# Patient Record
Sex: FEMALE | Race: WHITE | NOT HISPANIC OR LATINO
[De-identification: names, ages, dates, MRNs, and addresses within clinical notes are randomized per-mention and may not be internally consistent; named-entity substitution may affect disease eponyms.]

---

## 2021-03-18 ENCOUNTER — APPOINTMENT (OUTPATIENT)
Age: 46
End: 2021-03-18
Payer: COMMERCIAL

## 2021-03-18 PROCEDURE — 0001A: CPT

## 2021-04-23 ENCOUNTER — APPOINTMENT (OUTPATIENT)
Age: 46
End: 2021-04-23
Payer: COMMERCIAL

## 2021-04-23 PROCEDURE — 0002A: CPT

## 2021-05-07 PROBLEM — Z00.00 ENCOUNTER FOR PREVENTIVE HEALTH EXAMINATION: Status: ACTIVE | Noted: 2021-05-07

## 2021-05-10 ENCOUNTER — APPOINTMENT (OUTPATIENT)
Dept: PHYSICAL MEDICINE AND REHAB | Facility: CLINIC | Age: 46
End: 2021-05-10
Payer: COMMERCIAL

## 2021-05-10 VITALS — BODY MASS INDEX: 29.55 KG/M2 | WEIGHT: 195 LBS | HEIGHT: 68 IN | RESPIRATION RATE: 16 BRPM

## 2021-05-10 DIAGNOSIS — Z78.9 OTHER SPECIFIED HEALTH STATUS: ICD-10-CM

## 2021-05-10 PROCEDURE — 99072 ADDL SUPL MATRL&STAF TM PHE: CPT

## 2021-05-10 PROCEDURE — 99204 OFFICE O/P NEW MOD 45 MIN: CPT

## 2021-05-10 RX ORDER — CYCLOBENZAPRINE HYDROCHLORIDE 10 MG/1
10 TABLET, FILM COATED ORAL
Qty: 30 | Refills: 0 | Status: ACTIVE | COMMUNITY
Start: 2020-12-04

## 2021-05-10 NOTE — PHYSICAL EXAM
[FreeTextEntry1] : LUMBAR\par GEN: AAOx3. NAD.\par LS ROM: Flexion, extension, side-bending, rotation, oblique extension all full/pain free.  \par HIP ROM: Full and pain B/L.\par PALP: No TTP midline spinous processes, paravertebral muscles, SIJ, or greater trochanters B/L.\par INSP: Spine alignment is midline, with no evidence of scoliosis.\par STRENGTH: 4+/5 L-Ev/PF. Otherwise 5/5 BLE\par SENS: Grossly intact to LT BLE.\par REFLEXES: Symmetric patella, achilles. \par TONE: Normal, No clonus.\par STANCE: No Trendelenburg with single leg stance.\par GAIT: Non antalgic, normal reciprocating heel to toe\par SPECIAL: SLR and Slump (+) LEFT. FADIR/LEANNE (-) B/L.

## 2021-05-10 NOTE — ASSESSMENT
[FreeTextEntry1] : Impression:\par 1. Left S1 Radiculopathy\par \par Plan: The history and physical examination were reviewed. Symptoms are consistent with Chronic Left S1 radiculopathy.  The diagnosis was discussed with the patient along with treatment options including physical therapy, oral medication, interventional spine procedures, and surgery; as well as alternative therapeutics such as acupuncture and massage. We also discussed the importance of weight loss, ergonomics, and posture as they pertain to the current condition as well as overall health and well being. I am recommending that the patient undergo an MRI of the LSpine to further evaluate for disc pathology and nerve root involvement in preparation for possible RONY. The patient was educated on red flag symptoms and was instructed to call the office should the current condition worsen or new symptoms develop. The patient will follow up for imaging review. The patient expressed verbal understanding and is in agreement with the plan of care. All of the patient's questions and concerns were addressed during today's visit.\par \par A total of 30 minutes was spent for the duration of this encounter.\par

## 2021-05-10 NOTE — DATA REVIEWED
[FreeTextEntry1] : MRI LS 12/2018: L4/5 right paracentral/foraminal herniation with posterior annular fissure. L5/S1 left paracentral herniation with posterior annular fissure and impingement of the left S1 nerve root.

## 2021-05-10 NOTE — HISTORY OF PRESENT ILLNESS
[FreeTextEntry1] : Ms. IESHA GOTTLIEB is a very pleasant 45 year female who comes in for evaluation of Lower back pain that has been ongoing for 25 years without any specific injury or inciting event. The patient has done PT, Chiropractics, and oral medication with moderate but temporary relief. The pain is located primarily on her lower back radiating to left leg intermittent and described as achy and sharp. The pain is rated as 1/10 and ranges from 1-10/10. The patient's symptoms are aggravated by sitting, bending, stairs, transitioning into a different position and alleviated by rest, ice. The patient works for [employer] which consists of sitting. The patient feels numbness/tingling but denies any night pain, weakness, or bowel/bladder dysfunction. The patient has no other complaints at this time.\par

## 2021-05-11 ENCOUNTER — TRANSCRIPTION ENCOUNTER (OUTPATIENT)
Age: 46
End: 2021-05-11

## 2021-05-20 ENCOUNTER — APPOINTMENT (OUTPATIENT)
Dept: PHYSICAL MEDICINE AND REHAB | Facility: CLINIC | Age: 46
End: 2021-05-20

## 2021-10-14 ENCOUNTER — APPOINTMENT (OUTPATIENT)
Dept: INTERNAL MEDICINE | Facility: CLINIC | Age: 46
End: 2021-10-14
Payer: COMMERCIAL

## 2021-10-14 VITALS — WEIGHT: 200 LBS | BODY MASS INDEX: 30.31 KG/M2 | HEIGHT: 68 IN

## 2021-10-14 PROCEDURE — 97802 MEDICAL NUTRITION INDIV IN: CPT | Mod: 95

## 2021-11-04 ENCOUNTER — APPOINTMENT (OUTPATIENT)
Dept: INTERNAL MEDICINE | Facility: CLINIC | Age: 46
End: 2021-11-04
Payer: COMMERCIAL

## 2021-11-04 VITALS — WEIGHT: 198.06 LBS | HEIGHT: 68 IN | BODY MASS INDEX: 30.02 KG/M2

## 2021-11-04 PROCEDURE — 97803 MED NUTRITION INDIV SUBSEQ: CPT | Mod: 95

## 2021-12-16 ENCOUNTER — APPOINTMENT (OUTPATIENT)
Dept: INTERNAL MEDICINE | Facility: CLINIC | Age: 46
End: 2021-12-16
Payer: COMMERCIAL

## 2021-12-16 VITALS — HEIGHT: 68 IN | WEIGHT: 198 LBS | BODY MASS INDEX: 30.01 KG/M2

## 2021-12-16 PROCEDURE — 97803 MED NUTRITION INDIV SUBSEQ: CPT | Mod: 95

## 2022-01-27 ENCOUNTER — APPOINTMENT (OUTPATIENT)
Dept: INTERNAL MEDICINE | Facility: CLINIC | Age: 47
End: 2022-01-27
Payer: COMMERCIAL

## 2022-01-27 VITALS — HEIGHT: 68 IN | WEIGHT: 200 LBS | BODY MASS INDEX: 30.31 KG/M2

## 2022-01-27 PROCEDURE — 97803 MED NUTRITION INDIV SUBSEQ: CPT | Mod: 95

## 2022-02-17 ENCOUNTER — APPOINTMENT (OUTPATIENT)
Dept: INTERNAL MEDICINE | Facility: CLINIC | Age: 47
End: 2022-02-17
Payer: COMMERCIAL

## 2022-02-17 PROCEDURE — 97803 MED NUTRITION INDIV SUBSEQ: CPT | Mod: 95

## 2022-03-03 ENCOUNTER — APPOINTMENT (OUTPATIENT)
Dept: INTERNAL MEDICINE | Facility: CLINIC | Age: 47
End: 2022-03-03
Payer: COMMERCIAL

## 2022-03-03 PROCEDURE — 97803 MED NUTRITION INDIV SUBSEQ: CPT | Mod: 95

## 2022-03-31 ENCOUNTER — APPOINTMENT (OUTPATIENT)
Dept: INTERNAL MEDICINE | Facility: CLINIC | Age: 47
End: 2022-03-31
Payer: COMMERCIAL

## 2022-03-31 VITALS — BODY MASS INDEX: 31.07 KG/M2 | WEIGHT: 205 LBS | HEIGHT: 68 IN

## 2022-03-31 DIAGNOSIS — R63.5 ABNORMAL WEIGHT GAIN: ICD-10-CM

## 2022-03-31 PROCEDURE — 97803 MED NUTRITION INDIV SUBSEQ: CPT | Mod: 95

## 2022-04-01 PROBLEM — R63.5 WEIGHT GAIN: Status: ACTIVE | Noted: 2021-10-14

## 2022-04-13 ENCOUNTER — APPOINTMENT (OUTPATIENT)
Dept: ORTHOPEDIC SURGERY | Facility: CLINIC | Age: 47
End: 2022-04-13
Payer: COMMERCIAL

## 2022-04-13 ENCOUNTER — RESULT REVIEW (OUTPATIENT)
Age: 47
End: 2022-04-13

## 2022-04-13 ENCOUNTER — OUTPATIENT (OUTPATIENT)
Dept: OUTPATIENT SERVICES | Facility: HOSPITAL | Age: 47
LOS: 1 days | End: 2022-04-13
Payer: COMMERCIAL

## 2022-04-13 VITALS — RESPIRATION RATE: 16 BRPM | BODY MASS INDEX: 30.31 KG/M2 | HEIGHT: 68 IN | WEIGHT: 200 LBS

## 2022-04-13 DIAGNOSIS — Z82.61 FAMILY HISTORY OF ARTHRITIS: ICD-10-CM

## 2022-04-13 DIAGNOSIS — Z87.39 PERSONAL HISTORY OF OTHER DISEASES OF THE MUSCULOSKELETAL SYSTEM AND CONNECTIVE TISSUE: ICD-10-CM

## 2022-04-13 PROCEDURE — 73610 X-RAY EXAM OF ANKLE: CPT

## 2022-04-13 PROCEDURE — 99203 OFFICE O/P NEW LOW 30 MIN: CPT

## 2022-04-13 PROCEDURE — 73630 X-RAY EXAM OF FOOT: CPT | Mod: 26,RT

## 2022-04-13 PROCEDURE — 73630 X-RAY EXAM OF FOOT: CPT

## 2022-04-13 PROCEDURE — 73610 X-RAY EXAM OF ANKLE: CPT | Mod: 26,RT

## 2022-04-13 RX ORDER — AMOXICILLIN AND CLAVULANATE POTASSIUM 875; 125 MG/1; MG/1
875-125 TABLET, COATED ORAL
Qty: 20 | Refills: 0 | Status: DISCONTINUED | COMMUNITY
Start: 2021-03-23 | End: 2022-04-13

## 2022-04-13 RX ORDER — PROMETHAZINE HYDROCHLORIDE AND DEXTROMETHORPHAN HYDROBROMIDE ORAL SOLUTION 15; 6.25 MG/5ML; MG/5ML
6.25-15 SOLUTION ORAL
Qty: 140 | Refills: 0 | Status: DISCONTINUED | COMMUNITY
Start: 2021-03-23 | End: 2022-04-13

## 2022-04-13 RX ORDER — VALACYCLOVIR 1 G/1
1 TABLET, FILM COATED ORAL
Qty: 20 | Refills: 0 | Status: DISCONTINUED | COMMUNITY
Start: 2020-11-28 | End: 2022-04-13

## 2022-04-13 RX ORDER — MOMETASONE FUROATE 1 MG/G
0.1 OINTMENT TOPICAL
Qty: 45 | Refills: 0 | Status: DISCONTINUED | COMMUNITY
Start: 2020-11-28 | End: 2022-04-13

## 2022-04-13 RX ORDER — DICLOFENAC SODIUM 50 MG/1
50 TABLET, DELAYED RELEASE ORAL
Qty: 90 | Refills: 0 | Status: DISCONTINUED | COMMUNITY
Start: 2020-12-04 | End: 2022-04-13

## 2022-04-13 RX ORDER — LIDOCAINE 4% 4 G/100G
4 CREAM TOPICAL
Qty: 30 | Refills: 0 | Status: DISCONTINUED | COMMUNITY
Start: 2020-11-28 | End: 2022-04-13

## 2022-04-13 RX ORDER — MUPIROCIN 20 MG/G
2 OINTMENT TOPICAL
Qty: 22 | Refills: 0 | Status: DISCONTINUED | COMMUNITY
Start: 2020-11-10 | End: 2022-04-13

## 2022-04-13 RX ORDER — CHLORHEXIDINE GLUCONATE, 0.12% ORAL RINSE 1.2 MG/ML
0.12 SOLUTION DENTAL
Qty: 473 | Refills: 0 | Status: DISCONTINUED | COMMUNITY
Start: 2021-03-15 | End: 2022-04-13

## 2022-05-05 ENCOUNTER — APPOINTMENT (OUTPATIENT)
Dept: INTERNAL MEDICINE | Facility: CLINIC | Age: 47
End: 2022-05-05

## 2022-05-17 ENCOUNTER — NON-APPOINTMENT (OUTPATIENT)
Age: 47
End: 2022-05-17

## 2022-06-08 ENCOUNTER — APPOINTMENT (OUTPATIENT)
Dept: ORTHOPEDIC SURGERY | Facility: CLINIC | Age: 47
End: 2022-06-08
Payer: COMMERCIAL

## 2022-06-08 VITALS — BODY MASS INDEX: 30.31 KG/M2 | RESPIRATION RATE: 16 BRPM | WEIGHT: 200 LBS | HEIGHT: 68 IN

## 2022-06-08 DIAGNOSIS — M24.271 DISORDER OF LIGAMENT, RIGHT ANKLE: ICD-10-CM

## 2022-06-08 DIAGNOSIS — M25.571 PAIN IN RIGHT ANKLE AND JOINTS OF RIGHT FOOT: ICD-10-CM

## 2022-06-08 DIAGNOSIS — Q66.70 CONGEN PES CAVUS, UNSP FOOT: ICD-10-CM

## 2022-06-08 DIAGNOSIS — M24.272 DISORDER OF LIGAMENT, LEFT ANKLE: ICD-10-CM

## 2022-06-08 DIAGNOSIS — S93.491A SPRAIN OF OTHER LIGAMENT OF RIGHT ANKLE, INITIAL ENCOUNTER: ICD-10-CM

## 2022-06-08 PROCEDURE — 99213 OFFICE O/P EST LOW 20 MIN: CPT

## 2022-10-11 ENCOUNTER — NON-APPOINTMENT (OUTPATIENT)
Age: 47
End: 2022-10-11

## 2022-10-11 ENCOUNTER — APPOINTMENT (OUTPATIENT)
Dept: OPHTHALMOLOGY | Facility: CLINIC | Age: 47
End: 2022-10-11

## 2023-02-02 ENCOUNTER — RESULT REVIEW (OUTPATIENT)
Age: 48
End: 2023-02-02

## 2023-02-22 ENCOUNTER — APPOINTMENT (OUTPATIENT)
Dept: OPHTHALMOLOGY | Facility: CLINIC | Age: 48
End: 2023-02-22

## 2023-02-22 ENCOUNTER — NON-APPOINTMENT (OUTPATIENT)
Age: 48
End: 2023-02-22

## 2023-11-14 ENCOUNTER — NON-APPOINTMENT (OUTPATIENT)
Age: 48
End: 2023-11-14

## 2023-11-29 ENCOUNTER — APPOINTMENT (OUTPATIENT)
Dept: BARIATRICS/WEIGHT MGMT | Facility: CLINIC | Age: 48
End: 2023-11-29
Payer: COMMERCIAL

## 2023-11-29 VITALS — HEIGHT: 67 IN | WEIGHT: 215 LBS | BODY MASS INDEX: 33.74 KG/M2

## 2023-11-29 PROCEDURE — 97802 MEDICAL NUTRITION INDIV IN: CPT

## 2023-11-30 ENCOUNTER — APPOINTMENT (OUTPATIENT)
Dept: ORTHOPEDIC SURGERY | Facility: CLINIC | Age: 48
End: 2023-11-30
Payer: COMMERCIAL

## 2023-11-30 VITALS — HEIGHT: 67 IN | BODY MASS INDEX: 33.74 KG/M2 | WEIGHT: 215 LBS

## 2023-11-30 DIAGNOSIS — M94.269 CHONDROMALACIA, UNSPECIFIED KNEE: ICD-10-CM

## 2023-11-30 PROCEDURE — 99213 OFFICE O/P EST LOW 20 MIN: CPT

## 2023-11-30 PROCEDURE — 99203 OFFICE O/P NEW LOW 30 MIN: CPT

## 2023-12-04 PROBLEM — M94.269 CHONDROMALACIA, KNEE: Status: ACTIVE | Noted: 2023-12-04

## 2023-12-11 ENCOUNTER — APPOINTMENT (OUTPATIENT)
Dept: OBGYN | Facility: CLINIC | Age: 48
End: 2023-12-11
Payer: COMMERCIAL

## 2023-12-11 VITALS
WEIGHT: 215 LBS | SYSTOLIC BLOOD PRESSURE: 132 MMHG | OXYGEN SATURATION: 99 % | HEART RATE: 75 BPM | DIASTOLIC BLOOD PRESSURE: 94 MMHG | BODY MASS INDEX: 33.67 KG/M2

## 2023-12-11 DIAGNOSIS — Z78.9 OTHER SPECIFIED HEALTH STATUS: ICD-10-CM

## 2023-12-11 DIAGNOSIS — Z12.39 ENCOUNTER FOR OTHER SCREENING FOR MALIGNANT NEOPLASM OF BREAST: ICD-10-CM

## 2023-12-11 DIAGNOSIS — Z01.419 ENCOUNTER FOR GYNECOLOGICAL EXAMINATION (GENERAL) (ROUTINE) W/OUT ABNORMAL FINDINGS: ICD-10-CM

## 2023-12-11 DIAGNOSIS — R35.1 NOCTURIA: ICD-10-CM

## 2023-12-11 DIAGNOSIS — R23.2 FLUSHING: ICD-10-CM

## 2023-12-11 PROCEDURE — 99386 PREV VISIT NEW AGE 40-64: CPT

## 2023-12-11 RX ORDER — MULTIVIT-MIN/FOLIC/VIT K/LYCOP 400-300MCG
1000 TABLET ORAL
Refills: 0 | Status: ACTIVE | COMMUNITY

## 2023-12-27 LAB
BACTERIA UR CULT: NORMAL
CYTOLOGY CVX/VAG DOC THIN PREP: NORMAL
HPV HIGH+LOW RISK DNA PNL CVX: NOT DETECTED

## 2024-01-02 ENCOUNTER — APPOINTMENT (OUTPATIENT)
Dept: BARIATRICS/WEIGHT MGMT | Facility: CLINIC | Age: 49
End: 2024-01-02
Payer: COMMERCIAL

## 2024-01-02 VITALS — WEIGHT: 213.7 LBS | BODY MASS INDEX: 33.47 KG/M2

## 2024-01-02 PROCEDURE — 97803 MED NUTRITION INDIV SUBSEQ: CPT

## 2024-01-19 ENCOUNTER — APPOINTMENT (OUTPATIENT)
Dept: ENDOCRINOLOGY | Facility: CLINIC | Age: 49
End: 2024-01-19

## 2024-01-31 ENCOUNTER — APPOINTMENT (OUTPATIENT)
Dept: BARIATRICS/WEIGHT MGMT | Facility: CLINIC | Age: 49
End: 2024-01-31
Payer: COMMERCIAL

## 2024-01-31 ENCOUNTER — APPOINTMENT (OUTPATIENT)
Dept: BARIATRICS/WEIGHT MGMT | Facility: CLINIC | Age: 49
End: 2024-01-31

## 2024-01-31 VITALS — WEIGHT: 212.8 LBS | BODY MASS INDEX: 33.33 KG/M2

## 2024-01-31 DIAGNOSIS — Z86.39 PERSONAL HISTORY OF OTHER ENDOCRINE, NUTRITIONAL AND METABOLIC DISEASE: ICD-10-CM

## 2024-01-31 PROCEDURE — 97803 MED NUTRITION INDIV SUBSEQ: CPT

## 2024-03-09 VITALS — BODY MASS INDEX: 33.67 KG/M2 | WEIGHT: 215 LBS

## 2024-03-11 ENCOUNTER — APPOINTMENT (OUTPATIENT)
Dept: BARIATRICS/WEIGHT MGMT | Facility: CLINIC | Age: 49
End: 2024-03-11
Payer: COMMERCIAL

## 2024-03-11 PROCEDURE — 97803 MED NUTRITION INDIV SUBSEQ: CPT | Mod: 95

## 2024-03-27 ENCOUNTER — APPOINTMENT (OUTPATIENT)
Dept: PAIN MANAGEMENT | Facility: CLINIC | Age: 49
End: 2024-03-27

## 2024-03-28 ENCOUNTER — APPOINTMENT (OUTPATIENT)
Dept: ORTHOPEDIC SURGERY | Facility: CLINIC | Age: 49
End: 2024-03-28
Payer: COMMERCIAL

## 2024-03-28 VITALS
HEIGHT: 67 IN | BODY MASS INDEX: 33.74 KG/M2 | TEMPERATURE: 97.8 F | DIASTOLIC BLOOD PRESSURE: 80 MMHG | WEIGHT: 215 LBS | SYSTOLIC BLOOD PRESSURE: 115 MMHG

## 2024-03-28 DIAGNOSIS — M54.17 RADICULOPATHY, LUMBOSACRAL REGION: ICD-10-CM

## 2024-03-28 DIAGNOSIS — M54.50 LOW BACK PAIN, UNSPECIFIED: ICD-10-CM

## 2024-03-28 PROCEDURE — 72110 X-RAY EXAM L-2 SPINE 4/>VWS: CPT

## 2024-03-28 PROCEDURE — 99205 OFFICE O/P NEW HI 60 MIN: CPT

## 2024-03-28 PROCEDURE — 99215 OFFICE O/P EST HI 40 MIN: CPT

## 2024-03-28 RX ORDER — TIZANIDINE 4 MG/1
4 TABLET ORAL 3 TIMES DAILY
Qty: 40 | Refills: 2 | Status: ACTIVE | COMMUNITY
Start: 2024-03-28 | End: 1900-01-01

## 2024-03-28 NOTE — PHYSICAL EXAM
[UE/LE] : Sensory: Intact in bilateral upper & lower extremities [Normal Touch] : sensation intact for touch [Normal Proprioception] : sensation intact for proprioception [Normal] : No costovertebral angle tenderness and no spinal tenderness [de-identified] : 4 view lumbar spine x-ray 3/20/2024 Ortho PACS: Length discrepancy right shorter than left approximately 1 cm.  Compensatory mild lumbar scoliosis.  Disc base heights maintained.  No evidence of instability listhesis.  MRI lumbar spine [facility] 2021: Very minimal disc bulge at L4-5 paracentral without nerve root compression.  Left paracentral disc bulge L5-S1 contacting but not compressing the traversing S1 root

## 2024-03-28 NOTE — DISCUSSION/SUMMARY
[de-identified] : I recommended ongoing use of medications including anti-inflammatory and muscle relaxant should they provide relief.  We also continued recommending the steroid as prescribed by her PCP.  Additionally I feel that she would benefit in the short-term from a course of physical therapy to alleviate symptoms and also in the long-term to optimize the lumbar mechanics to minimize these episodes moving forward.  I do not feel strongly that additional MRI given the absence of radicular symptoms presently.  She will keep us informed of her progress moving forward  I have spent greater than 60 minutes preparing to see the patient, collecting relevant history, performing a thorough history and physical examination, counseling the patient regarding my findings ordering the appropriate therapies and tests, communicating with other relevant healthcare professionals, documenting my encounter and coordinating care.
[General Appearance - In No Acute Distress] : in no acute distress
[General Appearance - Alert] : alert
[General Appearance - Well Nourished] : well nourished
[General Appearance - Well-Appearing] : healthy appearing
[Sclera] : the sclera and conjunctiva were normal
[PERRL With Normal Accommodation] : pupils were equal in size, round, reactive to light
[Extraocular Movements] : extraocular movements were intact
[Outer Ear] : the ears and nose were normal in appearance
[Hearing Threshold Finger Rub Not [place]] : hearing was normal
[Examination Of The Oral Cavity] : the lips and gums were normal
[Oropharynx] : the oropharynx was normal with no thrush
[Neck Appearance] : the appearance of the neck was normal
[Jugular Venous Distention Increased] : there was no jugular-venous distention
[Neck Cervical Mass (___cm)] : no neck mass was observed
[Thyroid Diffuse Enlargement] : the thyroid was not enlarged
[Respiration, Rhythm And Depth] : normal respiratory rhythm and effort
[Exaggerated Use Of Accessory Muscles For Inspiration] : no accessory muscle use
[Auscultation Breath Sounds / Voice Sounds] : lungs were clear to auscultation bilaterally
[Heart Rate And Rhythm] : heart rate was normal and rhythm regular
[Heart Sounds] : normal S1 and S2
[Heart Sounds Gallop] : no gallops
[Murmurs] : no murmurs
[Heart Sounds Pericardial Friction Rub] : no pericardial rub
[Full Pulse] : the pedal pulses are present
[Edema] : there was no peripheral edema
[Bowel Sounds] : normal bowel sounds
[FreeTextEntry1] : Labs reviewed.\par Give bactrim for possible UTI.\par Valtrex for HSV1.\par RTO prn.
[Abdomen Tenderness] : non-tender
[Abdomen Soft] : soft
[] : no hepato-splenomegaly
[Abdomen Mass (___ Cm)] : no abdominal mass palpated
[Costovertebral Angle Tenderness] : no CVA tenderness
[No Palpable Adenopathy] : no palpable adenopathy
[Cervical Lymph Nodes Enlarged Posterior Bilaterally] : posterior cervical
[Cervical Lymph Nodes Enlarged Anterior Bilaterally] : anterior cervical
[Femoral Lymph Nodes Enlarged Bilaterally] : femoral
[Inguinal Lymph Nodes Enlarged Bilaterally] : inguinal
[Musculoskeletal - Swelling] : no joint swelling
[Range of Motion to Joints] : range of motion to joints
[Nail Clubbing] : no clubbing  or cyanosis of the fingernails
[Motor Tone] : muscle strength and tone were normal
[Cranial Nerves] : cranial nerves 2-12 were intact
[Sensation] : the sensory exam was normal to light touch and pinprick
[Motor Exam] : the motor exam was normal
[No Focal Deficits] : no focal deficits
[Oriented To Time, Place, And Person] : oriented to person, place, and time
[Affect] : the affect was normal
[FreeTextEntry1] : excessive sun esposure

## 2024-03-28 NOTE — ASSESSMENT
[FreeTextEntry1] : 48-year-old female with intermittent severe low back pain episodes most likely secondary to myofascial strain spasm

## 2024-03-28 NOTE — HISTORY OF PRESENT ILLNESS
[de-identified] : 48-year-old female presents as new patient evaluation of intermittent chronic low back pain.  States this began approximately 25 years ago atraumatically.  She describes several episodes per year of severe tightness and pain in the lumbosacral junction in the midline with radiation to the lumbar region diffusely.  In 2015 she underwent several months of spinal decompression therapy with chiropractor which helped her and provided several years of near complete relief.  Approximately 5 days ago she was getting out of the shower and leaned to the side and notices cute onset of severe pain pressure that was disabling and required bedrest for 1 to 2 days.  She is been taking Flexeril Tylenol and prednisone and has noticed some gradual improvement over the last several days.  Denies any rating pain numbness tingling weakness in extremities.  In the past has had some left-sided hamstring and calf pain in 2015 for approximately 2 weeks which resolved. Sitting has been very difficult for her and she does some find some relief with walking or laying down flat

## 2024-04-15 ENCOUNTER — APPOINTMENT (OUTPATIENT)
Dept: BARIATRICS/WEIGHT MGMT | Facility: CLINIC | Age: 49
End: 2024-04-15
Payer: COMMERCIAL

## 2024-04-15 PROCEDURE — 97803 MED NUTRITION INDIV SUBSEQ: CPT | Mod: 95

## 2024-05-16 ENCOUNTER — APPOINTMENT (OUTPATIENT)
Dept: ENDOCRINOLOGY | Facility: CLINIC | Age: 49
End: 2024-05-16

## 2024-05-16 ENCOUNTER — APPOINTMENT (OUTPATIENT)
Dept: BARIATRICS/WEIGHT MGMT | Facility: CLINIC | Age: 49
End: 2024-05-16
Payer: COMMERCIAL

## 2024-05-16 VITALS — WEIGHT: 215.4 LBS | BODY MASS INDEX: 33.74 KG/M2

## 2024-05-16 DIAGNOSIS — E66.09 OTHER OBESITY DUE TO EXCESS CALORIES: ICD-10-CM

## 2024-05-16 PROCEDURE — 97803 MED NUTRITION INDIV SUBSEQ: CPT | Mod: 95

## 2024-06-06 ENCOUNTER — APPOINTMENT (OUTPATIENT)
Dept: BARIATRICS/WEIGHT MGMT | Facility: CLINIC | Age: 49
End: 2024-06-06
Payer: COMMERCIAL

## 2024-06-06 DIAGNOSIS — E66.9 OBESITY, UNSPECIFIED: ICD-10-CM

## 2024-06-06 PROCEDURE — G2211 COMPLEX E/M VISIT ADD ON: CPT

## 2024-06-06 PROCEDURE — 99204 OFFICE O/P NEW MOD 45 MIN: CPT

## 2024-06-06 RX ORDER — MELOXICAM 15 MG/1
15 TABLET ORAL
Qty: 30 | Refills: 2 | Status: COMPLETED | COMMUNITY
Start: 2022-04-13 | End: 2024-06-06

## 2024-06-06 RX ORDER — METFORMIN HYDROCHLORIDE 1000 MG/1
1000 TABLET, COATED ORAL
Refills: 0 | Status: COMPLETED | COMMUNITY
End: 2024-06-06

## 2024-06-06 NOTE — HISTORY OF PRESENT ILLNESS
[Improved Health] : Improved health [Quality of Life] : Quality of life [FreeTextEntry1] :  This visit was provided via telehealth using real-time 2-way audio visual technology. The patient, IESHA GOTTLIEB, was located at home, [address] [place] [address], at the time of the visit. The provider, ELIAS OROZCO, was located at Home ([place]) at the time of the visit. The patient, IESHA GOTTLIEB and Provider participated in the telehealth encounter. Patient consented to video call  Medical Hx: Hashimoto's thyroiditis (not on meds), herniated disc, exercise induced rhabdomyolysis, IBS, LDL (not on meds)  Struggling with weight more significantly in the past 4-5 years. Past Wt Loss Attempts: Lifestyle-worked with RD, physical activity, Metformin   5/17/24: WT: 215 lbs BMI 33.7  Dietary pattern: Working closely with RD Lots of fiber ETOH: social  Water Intake: adequate    Exercise Regimen:  Sleep: Poor sleep for the past 5 years  Stress Level: manageable  Gyn: Post-marielena 2 yrs; lucretia since early 40s  GYN prescribed Veozah--never started due to insurance

## 2024-06-07 ENCOUNTER — TRANSCRIPTION ENCOUNTER (OUTPATIENT)
Age: 49
End: 2024-06-07

## 2024-06-10 ENCOUNTER — TRANSCRIPTION ENCOUNTER (OUTPATIENT)
Age: 49
End: 2024-06-10

## 2024-06-10 LAB
24R-OH-CALCIDIOL SERPL-MCNC: 54 PG/ML
ANION GAP SERPL CALC-SCNC: 14 MMOL/L
BUN SERPL-MCNC: 18 MG/DL
CALCIUM SERPL-MCNC: 10.1 MG/DL
CHLORIDE SERPL-SCNC: 106 MMOL/L
CHOLEST SERPL-MCNC: 205 MG/DL
CO2 SERPL-SCNC: 21 MMOL/L
CREAT SERPL-MCNC: 0.73 MG/DL
EGFR: 100 ML/MIN/1.73M2
ESTIMATED AVERAGE GLUCOSE: 105 MG/DL
GLUCOSE SERPL-MCNC: 88 MG/DL
HBA1C MFR BLD HPLC: 5.3 %
HDLC SERPL-MCNC: 59 MG/DL
INSULIN SERPL-MCNC: 9.4 UU/ML
LDLC SERPL CALC-MCNC: 135 MG/DL
NONHDLC SERPL-MCNC: 145 MG/DL
POTASSIUM SERPL-SCNC: 4.7 MMOL/L
SODIUM SERPL-SCNC: 141 MMOL/L
TRIGL SERPL-MCNC: 59 MG/DL
TSH SERPL-ACNC: 2.93 UIU/ML

## 2024-06-12 ENCOUNTER — APPOINTMENT (OUTPATIENT)
Dept: ENDOCRINOLOGY | Facility: CLINIC | Age: 49
End: 2024-06-12

## 2024-06-12 ENCOUNTER — TRANSCRIPTION ENCOUNTER (OUTPATIENT)
Age: 49
End: 2024-06-12

## 2024-06-12 RX ORDER — FEZOLINETANT 45 MG/1
45 TABLET, FILM COATED ORAL
Qty: 30 | Refills: 3 | Status: ACTIVE | COMMUNITY
Start: 2023-12-11 | End: 1900-01-01

## 2024-07-01 ENCOUNTER — TRANSCRIPTION ENCOUNTER (OUTPATIENT)
Age: 49
End: 2024-07-01

## 2024-07-15 ENCOUNTER — APPOINTMENT (OUTPATIENT)
Dept: BARIATRICS/WEIGHT MGMT | Facility: CLINIC | Age: 49
End: 2024-07-15
Payer: COMMERCIAL

## 2024-07-15 VITALS — BODY MASS INDEX: 31.87 KG/M2 | WEIGHT: 203.5 LBS

## 2024-07-15 DIAGNOSIS — E66.9 OBESITY, UNSPECIFIED: ICD-10-CM

## 2024-07-15 PROCEDURE — 97803 MED NUTRITION INDIV SUBSEQ: CPT | Mod: 95

## 2024-08-05 ENCOUNTER — APPOINTMENT (OUTPATIENT)
Dept: BARIATRICS/WEIGHT MGMT | Facility: CLINIC | Age: 49
End: 2024-08-05

## 2024-08-05 PROCEDURE — G2211 COMPLEX E/M VISIT ADD ON: CPT

## 2024-08-05 PROCEDURE — 99214 OFFICE O/P EST MOD 30 MIN: CPT

## 2024-08-05 NOTE — HISTORY OF PRESENT ILLNESS
[FreeTextEntry1] : This visit was provided via telehealth using real-time 2-way audio visual technology. The patient, IESHA GOTTLIEB, was located at home, [address] [place] [address], at the time of the visit. The provider, ELIAS OROZCO, was located in the office ([place]) at the time of the visit. The patient, IESHA GOTTLIEB and Provider participated in the telehealth encounter. Patient consented to video call  Medical Hx: Hashimoto's thyroiditis (not on meds), herniated disc, exercise induced rhabdomyolysis, IBS, LDL (not on meds)  Struggling with weight more significantly in the past 4-5 years. Past Wt Loss Attempts: Lifestyle-worked with RD, physical activity, Metformin   5/17/24: WT: 215 lbs BMI 33.7  Dietary pattern: Working closely with RD Lots of fiber ETOH: social  Water Intake: adequate    Exercise Regimen:  Sleep: Poor sleep for the past 5 years  Stress Level: manageable  Gyn: Post-marielena 2 yrs; lucretia since early 40s  GYN prescribed Veozah--never started due to insurance   8/5/24: 200 lbs; -15 lbs total. Zepbound 5 mg weekly, +BM daily on Miralax, fatigue day after injection. Averaging 1000 benson per day, 100-120 grams of protein, 30 grams of fiber; [id number] prior  Joined the gym,  once a month as of September. Has not started Veozah- awaiting insurance approval; high ALT 53. Will send in labs.

## 2024-09-16 ENCOUNTER — TRANSCRIPTION ENCOUNTER (OUTPATIENT)
Age: 49
End: 2024-09-16

## 2024-09-17 ENCOUNTER — TRANSCRIPTION ENCOUNTER (OUTPATIENT)
Age: 49
End: 2024-09-17

## 2024-09-24 ENCOUNTER — APPOINTMENT (OUTPATIENT)
Dept: BARIATRICS/WEIGHT MGMT | Facility: CLINIC | Age: 49
End: 2024-09-24
Payer: COMMERCIAL

## 2024-09-24 VITALS — BODY MASS INDEX: 30.37 KG/M2 | HEIGHT: 67 IN | WEIGHT: 193.5 LBS

## 2024-09-24 DIAGNOSIS — E66.9 OBESITY, UNSPECIFIED: ICD-10-CM

## 2024-09-24 PROCEDURE — 97803 MED NUTRITION INDIV SUBSEQ: CPT | Mod: 95

## 2024-10-14 ENCOUNTER — RX RENEWAL (OUTPATIENT)
Age: 49
End: 2024-10-14

## 2024-10-16 RX ORDER — DICLOFENAC POTASSIUM 50 MG/1
50 TABLET, COATED ORAL
Qty: 40 | Refills: 2 | Status: ACTIVE | COMMUNITY
Start: 2024-10-16 | End: 1900-01-01

## 2024-11-12 ENCOUNTER — RX RENEWAL (OUTPATIENT)
Age: 49
End: 2024-11-12

## 2024-11-25 ENCOUNTER — APPOINTMENT (OUTPATIENT)
Dept: BARIATRICS/WEIGHT MGMT | Facility: CLINIC | Age: 49
End: 2024-11-25

## 2024-12-12 ENCOUNTER — APPOINTMENT (OUTPATIENT)
Dept: OBGYN | Facility: CLINIC | Age: 49
End: 2024-12-12
Payer: COMMERCIAL

## 2024-12-12 VITALS
BODY MASS INDEX: 29.51 KG/M2 | DIASTOLIC BLOOD PRESSURE: 85 MMHG | WEIGHT: 188 LBS | HEART RATE: 81 BPM | HEIGHT: 67 IN | SYSTOLIC BLOOD PRESSURE: 108 MMHG | OXYGEN SATURATION: 99 %

## 2024-12-12 DIAGNOSIS — Z12.83 ENCOUNTER FOR SCREENING FOR MALIGNANT NEOPLASM OF SKIN: ICD-10-CM

## 2024-12-12 PROCEDURE — 99396 PREV VISIT EST AGE 40-64: CPT

## 2024-12-13 LAB
ALBUMIN SERPL ELPH-MCNC: 5 G/DL
ALP BLD-CCNC: 92 U/L
ALT SERPL-CCNC: 22 U/L
ANION GAP SERPL CALC-SCNC: 11 MMOL/L
AST SERPL-CCNC: 21 U/L
BILIRUB SERPL-MCNC: 0.6 MG/DL
BUN SERPL-MCNC: 21 MG/DL
CALCIUM SERPL-MCNC: 10.5 MG/DL
CHLORIDE SERPL-SCNC: 104 MMOL/L
CO2 SERPL-SCNC: 25 MMOL/L
CREAT SERPL-MCNC: 0.76 MG/DL
EGFR: 95 ML/MIN/1.73M2
GLUCOSE SERPL-MCNC: 82 MG/DL
HPV HIGH+LOW RISK DNA PNL CVX: NOT DETECTED
POTASSIUM SERPL-SCNC: 4.7 MMOL/L
PROT SERPL-MCNC: 7.9 G/DL
SODIUM SERPL-SCNC: 140 MMOL/L
TSH SERPL-ACNC: 2.22 UIU/ML

## 2024-12-19 LAB — CYTOLOGY CVX/VAG DOC THIN PREP: NORMAL

## 2025-01-15 ENCOUNTER — RX RENEWAL (OUTPATIENT)
Age: 50
End: 2025-01-15

## 2025-01-16 ENCOUNTER — RX RENEWAL (OUTPATIENT)
Age: 50
End: 2025-01-16

## 2025-04-28 ENCOUNTER — APPOINTMENT (OUTPATIENT)
Dept: RADIOLOGY | Facility: CLINIC | Age: 50
End: 2025-04-28
Payer: COMMERCIAL

## 2025-04-28 ENCOUNTER — OUTPATIENT (OUTPATIENT)
Dept: OUTPATIENT SERVICES | Facility: HOSPITAL | Age: 50
LOS: 1 days | End: 2025-04-28

## 2025-04-28 PROCEDURE — 77080 DXA BONE DENSITY AXIAL: CPT | Mod: 26
